# Patient Record
Sex: FEMALE | Race: WHITE | Employment: UNEMPLOYED | URBAN - METROPOLITAN AREA
[De-identification: names, ages, dates, MRNs, and addresses within clinical notes are randomized per-mention and may not be internally consistent; named-entity substitution may affect disease eponyms.]

---

## 2019-11-07 ENCOUNTER — HOSPITAL ENCOUNTER (INPATIENT)
Facility: CLINIC | Age: 49
LOS: 5 days | Discharge: HOME | DRG: 885 | End: 2019-11-12
Attending: PSYCHIATRY & NEUROLOGY | Admitting: PSYCHIATRY & NEUROLOGY
Payer: COMMERCIAL

## 2019-11-07 ENCOUNTER — HOSPITAL ENCOUNTER (EMERGENCY)
Facility: CLINIC | Age: 49
Discharge: PSYCHIATRIC OR SUBSTANCE USE FACILITY | End: 2019-11-07
Attending: EMERGENCY MEDICINE | Admitting: EMERGENCY MEDICINE
Payer: COMMERCIAL

## 2019-11-07 VITALS
TEMPERATURE: 98 F | RESPIRATION RATE: 18 BRPM | HEART RATE: 60 BPM | OXYGEN SATURATION: 98 % | BODY MASS INDEX: 29.45 KG/M2 | DIASTOLIC BLOOD PRESSURE: 59 MMHG | HEIGHT: 60 IN | WEIGHT: 150 LBS | SYSTOLIC BLOOD PRESSURE: 106 MMHG

## 2019-11-07 DIAGNOSIS — R45.851 SUICIDAL IDEATION: ICD-10-CM

## 2019-11-07 DIAGNOSIS — F33.2 SEVERE RECURRENT MAJOR DEPRESSION WITHOUT PSYCHOTIC FEATURES (H): Primary | ICD-10-CM

## 2019-11-07 PROBLEM — F32.A DEPRESSION: Status: ACTIVE | Noted: 2019-11-07

## 2019-11-07 LAB
AMPHETAMINES UR QL SCN: NEGATIVE
BARBITURATES UR QL: NEGATIVE
BENZODIAZ UR QL: NEGATIVE
CANNABINOIDS UR QL SCN: NEGATIVE
COCAINE UR QL: NEGATIVE
HCG UR QL: NEGATIVE
OPIATES UR QL SCN: NEGATIVE
PCP UR QL SCN: NEGATIVE

## 2019-11-07 PROCEDURE — 80307 DRUG TEST PRSMV CHEM ANLYZR: CPT | Performed by: EMERGENCY MEDICINE

## 2019-11-07 PROCEDURE — 25000132 ZZH RX MED GY IP 250 OP 250 PS 637: Mod: GY | Performed by: PSYCHIATRY & NEUROLOGY

## 2019-11-07 PROCEDURE — 12400000 ZZH R&B MH

## 2019-11-07 PROCEDURE — 99285 EMERGENCY DEPT VISIT HI MDM: CPT | Mod: 25

## 2019-11-07 PROCEDURE — 81025 URINE PREGNANCY TEST: CPT | Performed by: EMERGENCY MEDICINE

## 2019-11-07 PROCEDURE — 25000128 H RX IP 250 OP 636: Performed by: PSYCHIATRY & NEUROLOGY

## 2019-11-07 PROCEDURE — 90791 PSYCH DIAGNOSTIC EVALUATION: CPT

## 2019-11-07 RX ORDER — QUETIAPINE FUMARATE 50 MG/1
50 TABLET, FILM COATED ORAL AT BEDTIME
Status: DISCONTINUED | OUTPATIENT
Start: 2019-11-07 | End: 2019-11-08

## 2019-11-07 RX ORDER — HYDROXYZINE HYDROCHLORIDE 25 MG/1
25 TABLET, FILM COATED ORAL EVERY 4 HOURS PRN
Status: DISCONTINUED | OUTPATIENT
Start: 2019-11-07 | End: 2019-11-12 | Stop reason: HOSPADM

## 2019-11-07 RX ORDER — LEVOTHYROXINE SODIUM 75 UG/1
75 TABLET ORAL DAILY
Status: DISCONTINUED | OUTPATIENT
Start: 2019-11-07 | End: 2019-11-12 | Stop reason: HOSPADM

## 2019-11-07 RX ORDER — CYCLOBENZAPRINE HCL 10 MG
10 TABLET ORAL 3 TIMES DAILY PRN
Status: DISCONTINUED | OUTPATIENT
Start: 2019-11-07 | End: 2019-11-12 | Stop reason: HOSPADM

## 2019-11-07 RX ORDER — HYDROXYCHLOROQUINE SULFATE 200 MG/1
200 TABLET, FILM COATED ORAL DAILY
Status: DISCONTINUED | OUTPATIENT
Start: 2019-11-07 | End: 2019-11-08

## 2019-11-07 RX ORDER — QUETIAPINE FUMARATE 25 MG/1
25-50 TABLET, FILM COATED ORAL EVERY 6 HOURS PRN
Status: DISCONTINUED | OUTPATIENT
Start: 2019-11-07 | End: 2019-11-12 | Stop reason: HOSPADM

## 2019-11-07 RX ORDER — ONDANSETRON 4 MG/1
4 TABLET, ORALLY DISINTEGRATING ORAL EVERY 6 HOURS PRN
Status: DISCONTINUED | OUTPATIENT
Start: 2019-11-07 | End: 2019-11-12 | Stop reason: HOSPADM

## 2019-11-07 RX ORDER — LEVOTHYROXINE SODIUM 75 UG/1
75 TABLET ORAL DAILY
COMMUNITY

## 2019-11-07 RX ORDER — HYDROXYCHLOROQUINE SULFATE 200 MG/1
200 TABLET, FILM COATED ORAL DAILY
COMMUNITY

## 2019-11-07 RX ORDER — CYCLOBENZAPRINE HCL 5 MG
10 TABLET ORAL 3 TIMES DAILY PRN
COMMUNITY

## 2019-11-07 RX ADMIN — HYDROXYCHLOROQUINE SULFATE 200 MG: 200 TABLET ORAL at 18:23

## 2019-11-07 RX ADMIN — CYCLOBENZAPRINE HYDROCHLORIDE 10 MG: 10 TABLET, FILM COATED ORAL at 21:02

## 2019-11-07 RX ADMIN — ONDANSETRON 4 MG: 4 TABLET, ORALLY DISINTEGRATING ORAL at 16:52

## 2019-11-07 RX ADMIN — ACETAMINOPHEN, ASPIRIN AND CAFFEINE 1 TABLET: 250; 250; 65 TABLET, FILM COATED ORAL at 16:54

## 2019-11-07 RX ADMIN — LEVOTHYROXINE SODIUM 75 MCG: 75 TABLET ORAL at 16:52

## 2019-11-07 RX ADMIN — QUETIAPINE FUMARATE 50 MG: 50 TABLET ORAL at 21:03

## 2019-11-07 ASSESSMENT — ACTIVITIES OF DAILY LIVING (ADL)
SWALLOWING: 0-->SWALLOWS FOODS/LIQUIDS WITHOUT DIFFICULTY
DRESS: SCRUBS (BEHAVIORAL HEALTH)
AMBULATION: 0-->INDEPENDENT
HYGIENE/GROOMING: INDEPENDENT
FALL_HISTORY_WITHIN_LAST_SIX_MONTHS: NO
DRESS: 0-->INDEPENDENT
ORAL_HYGIENE: INDEPENDENT
LAUNDRY: UNABLE TO COMPLETE
COGNITION: 0 - NO COGNITION ISSUES REPORTED
TOILETING: 0-->INDEPENDENT
RETIRED_EATING: 0-->INDEPENDENT
BATHING: 0-->INDEPENDENT
RETIRED_COMMUNICATION: 0-->UNDERSTANDS/COMMUNICATES WITHOUT DIFFICULTY
TRANSFERRING: 0-->INDEPENDENT

## 2019-11-07 ASSESSMENT — MIFFLIN-ST. JEOR
SCORE: 1216.9
SCORE: 1058.14
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
## 2019-11-07 NOTE — PROGRESS NOTES
11/07/19 1417   Patient Belongings   Did you bring any home meds/supplements to the hospital?  Yes   Disposition of meds  Sent to security/pharmacy per site process   Patient Belongings locker;sent to security per site process   Patient Belongings Put in Hospital Secure Location (Security or Locker, etc.) other (see comments)   Belongings Search Yes   Clothing Search Yes   Second Staff Edwige GONZALEZ   Comment belongings searched      Cell phone   Jewelery   Sweatpants with strings   Sweat shirt with strings   Black boots  Bra   Grey t shirt   Socks   Purse   Wallet    Gift cards and business cards   [place] 's license   Paperwork   Perfume roller   Loose coins   USB cord (no adapter)   8gb hard drive   Headphones   Key chain with 3 keys and 2 car keys   Cyclobenzaprine HCL 10MG  $17 cash    Security Envelope #[id number]  VISA [id number]  AMEX [id number]  Care Credit [id number]  VISA 1220  VISA [id number]  VISA [id number]  $1,316.99 Check #[id number]      Admission:  I am responsible for any personal items that are not sent to the safe or pharmacy.  [facility] is not responsible for loss, theft or damage of any property in my possession.    Signature:  _________________________________ Date: _______  Time: _____                                              Staff Signature:  ____________________________ Date: ________  Time: _____      2nd Staff person, if patient is unable/unwilling to sign:    Signature: ________________________________ Date: ________  Time: _____     Discharge:  [facility] has returned all of my personal belongings:    Signature: _________________________________ Date: ________  Time: _____                                          Staff Signature:  ____________________________ Date: ________  Time: _____

## 2019-11-07 NOTE — ED PROVIDER NOTES
History     Chief Complaint:  Mental Health Problem    HPI  Rachael Martinez is a 49 year old female with a history of depression and SLE who presents to the emergency department today for evaluation of a mental pete problem. The patient reports a lot of recent changes in her life that has built up and is weighing heavily on her. Within the past year, she moved from [place] with her  in January, whom she  2.5 years ago. Her  got the job at [employer] as a mental pete therapist and she didn't. 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 She expresses feeling like a failure and is having difficulty keeping up with all the changes. She admits to not coping well with everything. The patient endorses thoughts of hurting herself and thoughts of stabbing herself in the arm with a letter opener. She was hospitalized once back in  after a suicide attempt where she tried to overdose on trazodone. She is afraid of harming herself and is open to hospitalization today. No thoughts of harming others.    Allergies:  No known drug allergies    Medications:    Flexeril    Past Medical History:    Depression  Lupus    Past Surgical History:    Cardiac ablation      Family History:    History reviewed. No pertinent family history.     Social History:  The patient reports that she has quit smoking. She smoked 0.00 packs per day. She has never used smokeless tobacco. She reports current alcohol use of about 3.0 standard drinks of alcohol per week. She reports that she does not use drugs.   PCP: No primary care provider on file.  Marital Status:     Review of Systems   Psychiatric/Behavioral: Positive for suicidal ideas.   All other systems reviewed and are negative.    Physical Exam     Patient Vitals for the past 24 hrs:   BP Temp Temp src Pulse Heart Rate Resp SpO2 Height Weight   19 1021 -- 98  F (36.7  C)  Oral -- -- -- -- 1.524 m (5') 68 kg (150 lb)   11/07/19 1016 110/70 -- Oral 65 66 20 100 % -- --     Physical Exam    General:   Pleasant, age appropriate female.  HEENT:    Oropharynx is moist.  Eyes:    Conjunctiva normal  Neck:    Supple, no meningismus.     CV:     Regular rate and rhythm.      No murmurs, rubs or gallops.       2+ radial pulses bilateral.   PULM:    Clear to auscultation bilateral.       No respiratory distress.      Good air exchange.  ABD:    Soft, non-tender, non-distended.       No rebound, guarding or rigidity.  MSK:     No gross deformity to all four extremities.   LYMPH:   No cervical lymphadenopathy.  NEURO:   Alert and oriented x 3.      Speech is clear with no aphasia.     Normal muscular tone, no tremor.  Skin:    Warm, dry and intact.    Psych:    Mood is depressed, affect is appropriate and congruent.     + suicidal ideation.     No delusions, hallucinations.     Memory intact.      Emergency Department Course     Laboratory:  Laboratory findings were communicated with the patient who voiced understanding of the findings.    Drug abuse screen 77 urine: Negative  HCG qual: Negative    Emergency Department Course:  Past medical records, nursing notes, and vitals reviewed.  1013: I performed an exam of the patient and obtained history, as documented above. GCS 15.    1056: I discussed the case with DEC regarding the patient.    1112: I discussed the case with DEC regarding the patient.    Findings and plan explained to the Patient.     Patient will be transferred to [facility] via EMS for admission to inpatient psychiatric bed.    Impression & Plan      Medical Decision Making:  Rachael Martinez is a 49 year old female who presents with suicidal ideation.  Patient is at high risk for suicide completion given her active suicidal thoughts with definitive plan, history of prior suicide attempt and inability to contract for safety.  Patient is agreeable to hospitalization.  Patient  was placed on SHITAL hold.  DEC is in agreement with acute psychiatric hospitalization.  Patient will be transferred to a psychiatric bed once bed available.    Diagnosis:    ICD-10-CM    1. Suicidal ideation R45.851 Drug abuse screen 77 urine     HCG qualitative urine       Disposition:   Transferred.      Scribe Disclosure:  I Drea Simón, am serving as a scribe at 10:13 AM on 11/7/2019 to document services personally performed by Simon Moss MD based on my observations and the provider's statements to me.    [facility] EMERGENCY DEPARTMENT       Simon Moss MD  11/07/19 9693

## 2019-11-07 NOTE — ED NOTES
Patient transferred via ambulance to [facility] 77. Report given to Sydnee at 1248. Patient transferred in stable condition. Pt calm and cooperative. ABCs intact. A&OX4. Report given to ambulance crew.

## 2019-11-07 NOTE — PROGRESS NOTES
Welcome packet reviewed with patient. Information reviewed includes getting emergency help, preventing infections, understanding your care, using medication safely, reducing falls, preventing pressure ulcers, smoking cessation, powerful choices and Patients Bill of Rights. Pt. given tour of the unit and instruction on use of facility including emergency call light. Program schedule reviewed with patient. Questions regarding the unit addressed. Pt. Search completed and belongings inventoried.    Nursing assessment complete including patient and medication profiles. Risk assessments completed addressing suicide,fall,skin,nutrition and safety issues. Care plan initiated. Assessments reviewed with physician and admit orders received. Video monitoring in progress, Patient Informed.
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
## 2019-11-08 LAB
ANION GAP SERPL CALCULATED.3IONS-SCNC: 5 MMOL/L (ref 3–14)
BUN SERPL-MCNC: 11 MG/DL (ref 7–30)
CALCIUM SERPL-MCNC: 8.6 MG/DL (ref 8.5–10.1)
CHLORIDE SERPL-SCNC: 110 MMOL/L (ref 94–109)
CO2 SERPL-SCNC: 26 MMOL/L (ref 20–32)
CREAT SERPL-MCNC: 0.9 MG/DL (ref 0.52–1.04)
ERYTHROCYTE [DISTWIDTH] IN BLOOD BY AUTOMATED COUNT: 13 % (ref 10–15)
GFR SERPL CREATININE-BSD FRML MDRD: 74 ML/MIN/{1.73_M2}
GLUCOSE SERPL-MCNC: 89 MG/DL (ref 70–99)
HCT VFR BLD AUTO: 39.5 % (ref 35–47)
HGB BLD-MCNC: 13 G/DL (ref 11.7–15.7)
MCH RBC QN AUTO: 28.3 PG (ref 26.5–33)
MCHC RBC AUTO-ENTMCNC: 32.9 G/DL (ref 31.5–36.5)
MCV RBC AUTO: 86 FL (ref 78–100)
PLATELET # BLD AUTO: 237 10E9/L (ref 150–450)
POTASSIUM SERPL-SCNC: 4.1 MMOL/L (ref 3.4–5.3)
RBC # BLD AUTO: 4.6 10E12/L (ref 3.8–5.2)
SODIUM SERPL-SCNC: 141 MMOL/L (ref 133–144)
T4 FREE SERPL-MCNC: 0.93 NG/DL (ref 0.76–1.46)
TSH SERPL DL<=0.005 MIU/L-ACNC: 10.4 MU/L (ref 0.4–4)
WBC # BLD AUTO: 3.6 10E9/L (ref 4–11)

## 2019-11-08 PROCEDURE — 84439 ASSAY OF FREE THYROXINE: CPT | Performed by: PSYCHIATRY & NEUROLOGY

## 2019-11-08 PROCEDURE — 25000132 ZZH RX MED GY IP 250 OP 250 PS 637: Mod: GY | Performed by: PSYCHIATRY & NEUROLOGY

## 2019-11-08 PROCEDURE — 25000132 ZZH RX MED GY IP 250 OP 250 PS 637: Mod: GY | Performed by: NURSE PRACTITIONER

## 2019-11-08 PROCEDURE — 80048 BASIC METABOLIC PNL TOTAL CA: CPT | Performed by: PSYCHIATRY & NEUROLOGY

## 2019-11-08 PROCEDURE — 12400000 ZZH R&B MH

## 2019-11-08 PROCEDURE — 84443 ASSAY THYROID STIM HORMONE: CPT | Performed by: PSYCHIATRY & NEUROLOGY

## 2019-11-08 PROCEDURE — 99223 1ST HOSP IP/OBS HIGH 75: CPT | Mod: AI | Performed by: NURSE PRACTITIONER

## 2019-11-08 PROCEDURE — 85027 COMPLETE CBC AUTOMATED: CPT | Performed by: PSYCHIATRY & NEUROLOGY

## 2019-11-08 PROCEDURE — 36415 COLL VENOUS BLD VENIPUNCTURE: CPT | Performed by: PSYCHIATRY & NEUROLOGY

## 2019-11-08 RX ORDER — QUETIAPINE FUMARATE 100 MG/1
100 TABLET, FILM COATED ORAL AT BEDTIME
Status: DISCONTINUED | OUTPATIENT
Start: 2019-11-08 | End: 2019-11-12 | Stop reason: HOSPADM

## 2019-11-08 RX ORDER — HYDROXYCHLOROQUINE SULFATE 200 MG/1
200 TABLET, FILM COATED ORAL DAILY
Status: DISCONTINUED | OUTPATIENT
Start: 2019-11-08 | End: 2019-11-12 | Stop reason: HOSPADM

## 2019-11-08 RX ORDER — ESCITALOPRAM OXALATE 10 MG/1
10 TABLET ORAL DAILY
Status: DISCONTINUED | OUTPATIENT
Start: 2019-11-08 | End: 2019-11-12 | Stop reason: HOSPADM

## 2019-11-08 RX ADMIN — QUETIAPINE FUMARATE 100 MG: 100 TABLET ORAL at 20:08

## 2019-11-08 RX ADMIN — LEVOTHYROXINE SODIUM 75 MCG: 75 TABLET ORAL at 10:15

## 2019-11-08 RX ADMIN — HYDROXYCHLOROQUINE SULFATE 200 MG: 200 TABLET ORAL at 10:16

## 2019-11-08 RX ADMIN — ESCITALOPRAM OXALATE 10 MG: 10 TABLET ORAL at 14:20

## 2019-11-08 ASSESSMENT — ACTIVITIES OF DAILY LIVING (ADL)
DRESS: INDEPENDENT
ORAL_HYGIENE: INDEPENDENT
HYGIENE/GROOMING: INDEPENDENT

## 2019-11-08 NOTE — PLAN OF CARE
"Pt mood is sad and depressed with full range affect. Thought process is organized. Insight is appropriate for her situation. Pt denies any suicidal ideation. She remains withdrawn and isolative to her room. C/o of nausea and a headache relieved with Zofran and Excedrin. Pt stated \"it's been a while\" since her last migraine. According to the patient, the her migraine typically causes her to be nauseated. She contracts for safety.  "

## 2019-11-08 NOTE — H&P
[facility]    History and Physical  Hospitalist       Date of Admission:  11/7/2019    Assessment & Plan   Rachael Martinez is a 49 year old female with a past medical history significant for hypothyroidism, SLE, depression, chronic back pain, alopecia and a history of cardiac ablation for afib in 2005 who presented to the Emergency Department for evaluation of depression and suicidal ideation.     Depression with suicidal ideation  Patient presents with worsening depression as well as thoughts of suicide with a plan. Reports multiple recent stressors, with active marital issues. Not on any psychiatric medications prior to admission.   --management per Psychiatry    Lupus Erythematosus. Patient follows with Rheumatology in [employer]. Two evals over the past year suggest low disease activity. PCP on 11/5 rechecked inflammatory markers and anti double stranded DNA (<12.3). Her eye exam was negative for cataracts, plans were to restart Plaquenil.   --Patient prefers to restart plaquenil, will start today    Chronic Back Pain  Thought to be muscular/mechanical nature, with some concern of fibromyalgia. Seen 11/5 with reevaluation of labs, inflammatory markers, CXR and EKG. Evaluated by [facility] most recently in 5/2019 with unremarkable bone scan for possible lupus activity. At that time, she had a left shoulder glenohumeral steroid injection which improved her symptoms for several months. Symptoms are currently under control with flexeril.   --Flexeril PRN, advised patient to use sparingly  --Tylenol PRN    Hypothyroidism. TSH last checked 11/5/2019 and elevated at 13.4 with T4 of 0.9. Patient started on levothyroxine at that time.   --continue PTA levothyroxine 75mcg daily  --will need TSH recheck in 6 weeks    DVT Prophylaxis: Low Risk/Ambulatory with no VTE prophylaxis indicated  Code Status: Full Code    Disposition: Expected discharge in 2-3 days at the discretion of psychiatry.    Hospital  service will sign off at this time, please do not hesitate to consult us for acute medical issues that arise during her stay.      LUIS Abernathy [facility]  Hospitalist Service  House Officer  Pager: [phone] (1q - 6p)      Primary Care Physician   [facility]    Chief Complaint   Depression with suicidal ideation    History is obtained from the patient    History of Present Illness   Rachael Martinez is a 49 year old female with a past medical history significant for hypothyroidism, SLE, depression, chronic back pain, alopecia who presented to the Emergency Department for evaluation of depression and suicidal ideation.     Past Medical History    I have reviewed this patient's medical history and updated it with pertinent information if needed.   Past Medical History:   Diagnosis Date     Chronic back pain      Depression      Hypothyroidism      Lupus (H)        Past Surgical History   I have reviewed this patient's surgical history and updated it with pertinent information if needed.  Past Surgical History:   Procedure Laterality Date     CARDIAC SURGERY      Cardiac ablation in      GYN SURGERY             Prior to Admission Medications   Prior to Admission Medications   Prescriptions Last Dose Informant Patient Reported? Taking?   cyclobenzaprine (FLEXERIL) 5 MG tablet Unknown at Unknown time  Yes No   Sig: Take 10 mg by mouth 3 times daily as needed for muscle spasms   hydroxychloroquine (PLAQUENIL) 200 MG tablet Unknown at Unknown time  Yes No   Sig: Take by mouth daily   levothyroxine (SYNTHROID/LEVOTHROID) 75 MCG tablet 2019 at Unknown time Self Yes Yes   Sig: Take 75 mcg by mouth      Facility-Administered Medications: None     Allergies   No Known Allergies    Social History   I have reviewed this patient's social history and updated it with pertinent information if needed. Rachael Martinez  reports that she has quit smoking. She smoked 0.00 packs per day. She has never  used smokeless tobacco. She reports current alcohol use of about 3.0 standard drinks of alcohol per week. She reports that she does not use drugs.    Family History   I have reviewed this patient's family history and updated it with pertinent information if needed.   Family History   Problem Relation Age of Onset     Breast Cancer Mother      Colon Cancer Father        Review of Systems   The 10 point Review of Systems is negative other than noted in the HPI or here.     Physical Exam   Temp: 98  F (36.7  C) Temp src: Oral BP: 104/64 Pulse: 67   Resp: 16 SpO2: 99 % O2 Device: None (Room air)    Vital Signs with Ranges  Temp:  [98  F (36.7  C)] 98  F (36.7  C)  Pulse:  [60-67] 67  Heart Rate:  [66] 66  Resp:  [16-20] 16  BP: (104-110)/(59-70) 104/64  SpO2:  [92 %-100 %] 99 %  115 lbs 0 oz    Physical Exam  Constitutional:       General: She is not in acute distress.     Appearance: Normal appearance. She is normal weight.   HENT:      Head: Normocephalic.      Mouth/Throat:      Mouth: Mucous membranes are dry.   Eyes:      Extraocular Movements: Extraocular movements intact.      Pupils: Pupils are equal, round, and reactive to light.   Neck:      Musculoskeletal: Neck supple.   Cardiovascular:      Rate and Rhythm: Normal rate and regular rhythm.      Heart sounds: Normal heart sounds. No murmur.   Pulmonary:      Effort: Pulmonary effort is normal. No respiratory distress.      Breath sounds: Normal breath sounds.   Abdominal:      General: Abdomen is flat.      Palpations: Abdomen is soft.      Tenderness: There is no tenderness.   Musculoskeletal: Normal range of motion.   Skin:     General: Skin is warm and dry.   Neurological:      General: No focal deficit present.      Mental Status: She is alert.   Psychiatric:         Attention and Perception: Attention normal. She does not perceive auditory or visual hallucinations.         Mood and Affect: Mood normal.         Speech: Speech normal.         Behavior:  Behavior normal.         Thought Content: Thought content normal.         Data   Data reviewed today:  I personally reviewed no images or EKG's today.  No lab results found in last 7 days.    No results found for this or any previous visit (from the past 24 hour(s)).

## 2019-11-08 NOTE — PLAN OF CARE
Shift Update: Pt mood is blunt and flat. Thought process is intact. Insight is intact. Pt denies suicidal ideation. Pt  is depressed and felt if she were at home she would be suicidal. Pt states she is a light sleeper and did not sleep last night as her roommate snores. She requested a private or else she would want to be discharged, Pt was moved to 729. Pt started on Lexapro.
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
## 2019-11-09 PROCEDURE — 25000132 ZZH RX MED GY IP 250 OP 250 PS 637: Mod: GY | Performed by: NURSE PRACTITIONER

## 2019-11-09 PROCEDURE — 99207 ZZC NON-BILLABLE SERV PER CHARTING: CPT | Performed by: NURSE PRACTITIONER

## 2019-11-09 PROCEDURE — 25000132 ZZH RX MED GY IP 250 OP 250 PS 637: Mod: GY | Performed by: PSYCHIATRY & NEUROLOGY

## 2019-11-09 PROCEDURE — 12400000 ZZH R&B MH

## 2019-11-09 RX ORDER — IBUPROFEN 200 MG
200 TABLET ORAL EVERY 6 HOURS PRN
Status: DISCONTINUED | OUTPATIENT
Start: 2019-11-09 | End: 2019-11-12 | Stop reason: HOSPADM

## 2019-11-09 RX ADMIN — LEVOTHYROXINE SODIUM 75 MCG: 75 TABLET ORAL at 09:21

## 2019-11-09 RX ADMIN — QUETIAPINE FUMARATE 100 MG: 100 TABLET ORAL at 21:36

## 2019-11-09 RX ADMIN — IBUPROFEN 200 MG: 200 TABLET, FILM COATED ORAL at 15:01

## 2019-11-09 RX ADMIN — HYDROXYCHLOROQUINE SULFATE 200 MG: 200 TABLET ORAL at 09:21

## 2019-11-09 RX ADMIN — ESCITALOPRAM OXALATE 10 MG: 10 TABLET ORAL at 09:21

## 2019-11-09 ASSESSMENT — ACTIVITIES OF DAILY LIVING (ADL)
LAUNDRY: WITH SUPERVISION
ORAL_HYGIENE: INDEPENDENT
DRESS: INDEPENDENT
ORAL_HYGIENE: INDEPENDENT
LAUNDRY: UNABLE TO COMPLETE
HYGIENE/GROOMING: INDEPENDENT
DRESS: INDEPENDENT
HYGIENE/GROOMING: INDEPENDENT

## 2019-11-09 NOTE — PLAN OF CARE
Problem: Depressive Symptoms  Goal: Depressive Symptoms  Description  Signs and symptoms of listed problems will be absent or manageable.  Depression, anxiety   Flowsheets (Taken 11/9/2019 4186)  Depressive Symptoms Assessed: suicidality; affect; mood; speech; sleep; psychomotor activity; thought process; insight; anxiety  Depressive Symptoms Present: suicidality; affect; anxiety; insight; thought process; psychomotor activity; sleep; speech; mood  Note:   Pt has been isolative but social upon approach. Pt has been ruminating about her job loss and concerned about her firing. Pt was anxious about the whole situational and upset that those things didn't change. Pt did come to the nurses statin about 1300 c/o of some Right sided facial swelling. Pt states she has had this happen before but is not sure if it is from her Lupus. Swelling ws not painful to the touch but when she was chewing or eating. Pt has not SOB and O2 ws 96 percent. Pt is pleasant and cooperative.Hospitalist saw pt for swelling. Assessed and hospitalist felt her swelling may be due to  Parotid stones. Pt encouraged to eat sour foods or sour candy to help alleviate pain. Pt also order ibuprofen in addition to manage many pain. Nursing to continue to monitor.

## 2019-11-09 NOTE — PLAN OF CARE
BEHAVIORAL TEAM DISCUSSION    Participants: MD, RN, CM, PA, OT   Progress: No change. Newly admitted to 77  Anticipated length of stay:  3-5 days  Continued Stay Criteria/Rationale: Psychiatric stabilization  Medical/Physical: Per hospitalist consult  Precautions: None  Behavioral Orders   Procedures    Code 1 - Restrict to Unit    Routine Programming     As clinically indicated    Status 15     Every 15 minutes.     Plan:  started on Lexapro at 10 mg daily and Seroquel at 100 mg at bedtime given her reported insomnia and ruminative thoughts.   Rationale for change in precautions or plan: Continued psychiatric stabilization.

## 2019-11-09 NOTE — PROGRESS NOTES
Brief house NP note  11/9/2019  1300    I was called to reassess Ms. Kaushal Martinez for unilateral right jaw swelling. Patient has had this before, and actually states this particular incident is less severe than previous episodes. She denies any acute pain, she is able to eat, swallow, manage her secretions. Exam shows small firm mandibular swelling directly below the pre auricular nodes, question parotid stone? The patient was concerned about her underlying lupus and if this was a symptom - we discussed the difficulty in discerning the etiology of the swelling but is willing to try treatment for parotid stones.  She was restarted on her Plaquenil yesterday.     Will continue to monitor, the patient is reliable to report concerning symptoms we discussed.        LUIS Abernathy [facility]  Hospitalist Service  House Officer  Pager: [phone] (9o - 6p)

## 2019-11-09 NOTE — PLAN OF CARE
Patient visible this shift, pleasant and cooperative. Declined groups, reading in room & in bed at 8pm. Patient hopes to catch up on sleep tonight now that she has a room to herself.   Patient mood is stable. Thought process is logical. Insight is improving. Pt denies suicidal ideation. Recent Med changes Lexapro added & 100mg Seroquel at HS. BGM none, Abnormal labs/ Vital signs none Privileges SDU, CIWA none. ECT# none. Plan med adjustment

## 2019-11-10 PROCEDURE — 25000132 ZZH RX MED GY IP 250 OP 250 PS 637: Mod: GY | Performed by: PSYCHIATRY & NEUROLOGY

## 2019-11-10 PROCEDURE — 25000132 ZZH RX MED GY IP 250 OP 250 PS 637: Mod: GY | Performed by: NURSE PRACTITIONER

## 2019-11-10 PROCEDURE — 12400000 ZZH R&B MH

## 2019-11-10 RX ADMIN — CYCLOBENZAPRINE HYDROCHLORIDE 10 MG: 10 TABLET, FILM COATED ORAL at 20:51

## 2019-11-10 RX ADMIN — ACETAMINOPHEN, ASPIRIN AND CAFFEINE 1 TABLET: 250; 250; 65 TABLET, FILM COATED ORAL at 14:41

## 2019-11-10 RX ADMIN — LEVOTHYROXINE SODIUM 75 MCG: 75 TABLET ORAL at 08:23

## 2019-11-10 RX ADMIN — QUETIAPINE FUMARATE 100 MG: 100 TABLET ORAL at 20:50

## 2019-11-10 RX ADMIN — ESCITALOPRAM OXALATE 10 MG: 10 TABLET ORAL at 08:23

## 2019-11-10 RX ADMIN — HYDROXYCHLOROQUINE SULFATE 200 MG: 200 TABLET ORAL at 08:23

## 2019-11-10 ASSESSMENT — ACTIVITIES OF DAILY LIVING (ADL)
LAUNDRY: UNABLE TO COMPLETE
HYGIENE/GROOMING: INDEPENDENT
ORAL_HYGIENE: INDEPENDENT
DRESS: INDEPENDENT

## 2019-11-10 NOTE — PLAN OF CARE
Pt is visible in the unit but minimally sociable with peers. Spent mores time bed resting in her room, Pt did however took shower this Morning and feels a little bit better. 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 Writer however encourage to still attend and she her expertise with other peers.  who is a therapist is also here visiting and they are doing pretty well. Pt is med compliant and denies any SI but reported some light headache and believes it might be sight effects of the medications. Excedrin administered and a can of prune Juice for constipation

## 2019-11-10 NOTE — PLAN OF CARE
Pt is A&O, presents as sad , mood is depresses, with flat affect. Speech is clear. Pt s thought process is organized. Behavior is appropriate. She was mostly isolative to her room and withdrawn. Pt is bright upon approach. She has insight into her situation. Displays. Pt endorsed suicidal ideation, but contracts for safety. Pt is hoping to be discharged by next Thursday.

## 2019-11-11 PROCEDURE — 12400000 ZZH R&B MH

## 2019-11-11 PROCEDURE — 25000132 ZZH RX MED GY IP 250 OP 250 PS 637: Mod: GY | Performed by: PSYCHIATRY & NEUROLOGY

## 2019-11-11 PROCEDURE — 25000132 ZZH RX MED GY IP 250 OP 250 PS 637: Mod: GY | Performed by: NURSE PRACTITIONER

## 2019-11-11 RX ADMIN — LEVOTHYROXINE SODIUM 75 MCG: 75 TABLET ORAL at 08:16

## 2019-11-11 RX ADMIN — QUETIAPINE FUMARATE 100 MG: 100 TABLET ORAL at 21:17

## 2019-11-11 RX ADMIN — IBUPROFEN 200 MG: 200 TABLET, FILM COATED ORAL at 08:16

## 2019-11-11 RX ADMIN — HYDROXYCHLOROQUINE SULFATE 200 MG: 200 TABLET ORAL at 08:16

## 2019-11-11 RX ADMIN — CYCLOBENZAPRINE HYDROCHLORIDE 10 MG: 10 TABLET, FILM COATED ORAL at 21:17

## 2019-11-11 RX ADMIN — ESCITALOPRAM OXALATE 10 MG: 10 TABLET ORAL at 08:16

## 2019-11-11 ASSESSMENT — ACTIVITIES OF DAILY LIVING (ADL)
ORAL_HYGIENE: INDEPENDENT
LAUNDRY: WITH SUPERVISION
HYGIENE/GROOMING: INDEPENDENT
DRESS: INDEPENDENT

## 2019-11-11 NOTE — PROGRESS NOTES
[facility] Psychiatric Progress Note      Interval History:   Pt seen, team meeting held with , nursing staff, OT, and PA's to review diagnosis and treatment plan.  Staff reported the patient has been bed resting for most of her stay in the hospital.  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  Her  visited over the weekend.  She denies experiencing any self-harm thoughts plans or intent and wonders how she got to this point.  She states she has been reflecting on events that led to her hospitalization and feels she would benefit from following up with a therapist and psychiatrist.  She states she would just like the treatment team to refer her to non-[facility] providers to avoid conflicts.  She is hoping to discharge tomorrow.  She denies experiencing any untoward effects from her currently prescribed medication.   Review of systems:   The Review of Systems completed by Tyshawn Naik MD is negative other than noted in the HPI     Medications:       escitalopram  10 mg Oral Daily     hydroxychloroquine  200 mg Oral Daily     levothyroxine  75 mcg Oral Daily     QUEtiapine  100 mg Oral At Bedtime     aspirin-acetaminophen-caffeine, cyclobenzaprine, hydrOXYzine, ibuprofen, ondansetron, QUEtiapine    Mental Status Examination:     Appearance:  awake, alert, adequately groomed, appeared as age stated and casually dressed  Attitude:  cooperative  Eye Contact:  better  Mood:  better  Affect:  appropriate and in normal range and mood congruent  Speech:  clear, coherent and normal prosody  Psychomotor Behavior:  no evidence of tardive dyskinesia, dystonia, or tics and intact station, gait and muscle tone  Thought Process:  logical, linear and goal oriented  Associations:  no loose associations  Thought Content:  no evidence of suicidal  ideation or homicidal ideation and no evidence of psychotic thought  Insight:  fair  Judgment:  fair  Oriented to:  time, person, and place  Attention Span and Concentration:  intact  Recent and Remote Memory:  fair  Language: Able to name objects, Able to repeat phrases and Able to read and write  Fund of Knowledge: appropriate  Muscle Strength and Tone: normal  Gait and Station: Normal          Labs/Vitals:   BP 97/59   Pulse 60   Temp 97.9  F (36.6  C) (Oral)   Resp 16   Ht 1.524 m (5')   Wt 52.2 kg (115 lb)   SpO2 99%   BMI 22.46 kg/m    No results found for this or any previous visit (from the past 24 hour(s)).    Impression:   Rachael De Jesus is a 49-year-old  mother of an adult son from a previous relationship, who presents to [facility] via [facility] ED on account of expressing suicidal ideations and worsening depression in the context of being fired from being a new job.  She had relocated to [place] within the year and was having a hard time settling into [place].  She claims everything just occurred on a slower pace and she does not understand the school system here in [place].       DIagnoses:     1.  Adjustment disorder with mixed anxiety and depressed mood.   2.  Major depressive disorder, recurrent, moderate.   3.  Systemic lupus erythematosus.   4.  History of traumatic brain injury.   5.  Hypothyroidism.   6.  History of [place]'s disease.  7.  Rule out unspecified personality disorder with borderline traits.            Plan:   1. Written information given on medications. Side effects, risks, benefits reviewed.  2. Medication changes: Maintain current medications without changes.  3. Legal Status: Voluntary.  4.  Continue hospitalization.  Possible discharge tomorrow.      Attestation:  Patient has been seen and evaluated by me, Tyshawn Naik MD today.    PATIENT ID  Name: Rachael Easley  Juan  MRN:[id number]  YOB: 1970

## 2019-11-11 NOTE — PLAN OF CARE
BEHAVIORAL TEAM DISCUSSION    Participants: MD, RN, CM, PA, OT   Progress: Slight improvement  Anticipated length of stay: Likely discharge tomorrow.   Continued Stay Criteria/Rationale: Continued psychiatric stabilization.  Medical/Physical: Per hospitalist consult.   Precautions: None  Behavioral Orders   Procedures    Code 1 - Restrict to Unit    Routine Programming     As clinically indicated    Status 15     Every 15 minutes.     Plan: Maintain current medications.   Rationale for change in precautions or plan: Continued psychiatric stabilization.

## 2019-11-11 NOTE — PLAN OF CARE
Problem: Depressive Symptoms  Goal: Depressive Symptoms  Description  Signs and symptoms of listed problems will be absent or manageable.  Depression, anxiety   Outcome: No Change  Flowsheets (Taken 11/11/2019 1242)  Depressive Symptoms Assessed: all  Depressive Symptoms Present: affect; mood; insight; other (see comment)  Note:   Pt presents with a flat affect and calm mood. Her thought processes are organized, insight is appropriate, and judgement is impaired. Pt was isolative, and spent most of the shift in her room. She plans to be discharged tomorrow, and hopes to meet with a  to receive information about outpatient resources. Pt is worried about finding work post-discharge, and continues to experience negative thoughts that contribute to her depression. She denies Si and is med-compliant.

## 2019-11-11 NOTE — PLAN OF CARE
"  Problem: Depressive Symptoms  Goal: Depressive Symptoms  Description  Signs and symptoms of listed problems will be absent or manageable.  Depression, anxiety   11/10/2019 2129 by Edwige Mcknight  Outcome: No Change  Flowsheets  Taken 11/9/2019 1352 by Chau Melvin RN  Depressive Symptoms Assessed: suicidality;affect;mood;speech;sleep;psychomotor activity;thought process;insight;anxiety  Taken 11/10/2019 2129 by Edwige Mcknight  Depressive Symptoms Present: affect;mood;anxiety;insight  Note:   Pt presents with a flat affect and depressed mood. Pt thought process and insight impaired. Pt spent most of the shift withdrawn to her room, reading or resting. 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 Pt is anxious and hoping to discharge soon. When asked if she feels she is ready to discharge, pt looked at staff and just said \"ambivalent\". Pt denies Si at this time.     "

## 2019-11-12 VITALS
HEIGHT: 60 IN | OXYGEN SATURATION: 97 % | SYSTOLIC BLOOD PRESSURE: 89 MMHG | BODY MASS INDEX: 22.58 KG/M2 | HEART RATE: 69 BPM | RESPIRATION RATE: 16 BRPM | WEIGHT: 115 LBS | TEMPERATURE: 97.8 F | DIASTOLIC BLOOD PRESSURE: 45 MMHG

## 2019-11-12 PROCEDURE — 25000132 ZZH RX MED GY IP 250 OP 250 PS 637: Mod: GY | Performed by: NURSE PRACTITIONER

## 2019-11-12 PROCEDURE — 25000132 ZZH RX MED GY IP 250 OP 250 PS 637: Mod: GY | Performed by: PSYCHIATRY & NEUROLOGY

## 2019-11-12 RX ORDER — HYDROXYZINE HYDROCHLORIDE 25 MG/1
25 TABLET, FILM COATED ORAL EVERY 8 HOURS PRN
Qty: 90 TABLET | Refills: 0 | Status: SHIPPED | OUTPATIENT
Start: 2019-11-12 | End: 2019-12-12

## 2019-11-12 RX ORDER — QUETIAPINE FUMARATE 100 MG/1
100 TABLET, FILM COATED ORAL AT BEDTIME
Qty: 30 TABLET | Refills: 0 | Status: SHIPPED | OUTPATIENT
Start: 2019-11-12 | End: 2019-12-12

## 2019-11-12 RX ORDER — ESCITALOPRAM OXALATE 10 MG/1
10 TABLET ORAL DAILY
Qty: 30 TABLET | Refills: 0 | Status: SHIPPED | OUTPATIENT
Start: 2019-11-13 | End: 2019-12-13

## 2019-11-12 RX ADMIN — LEVOTHYROXINE SODIUM 75 MCG: 75 TABLET ORAL at 08:33

## 2019-11-12 RX ADMIN — ESCITALOPRAM OXALATE 10 MG: 10 TABLET ORAL at 08:33

## 2019-11-12 RX ADMIN — HYDROXYCHLOROQUINE SULFATE 200 MG: 200 TABLET ORAL at 08:32

## 2019-11-12 ASSESSMENT — ACTIVITIES OF DAILY LIVING (ADL)
ORAL_HYGIENE: INDEPENDENT
DRESS: INDEPENDENT
HYGIENE/GROOMING: INDEPENDENT
LAUNDRY: WITH SUPERVISION

## 2019-11-12 ASSESSMENT — MIFFLIN-ST. JEOR: SCORE: 1058.14

## 2019-11-12 NOTE — PROGRESS NOTES
met with patient this morning to go over the aftercare options given to her by  yesterday afternoon. She decided that she would like to follow up with [facility] for both psychiatry and therapy.  had her sign a release of information form for [facility] and then called to make the referral. Patient stated to  that her car is at [facility] in the parking lot as she was brought by ambulance from [facility] to [facility].  will have patient's nurse call for a cab when she is ready to discharge to transport her back to her car.

## 2019-11-12 NOTE — DISCHARGE INSTRUCTIONS
Behavioral Discharge Planning and Instructions    Summary:  Admitted for worsening depression.     Main Diagnosis:   Adjustment Disorder with mixed anxiety and depressed mood; Major Depressive Disorder, recurrent, moderate; Systemic Lupus Erythematosus; History of Traumatic Brain Injury; Hypothyroidism; History of [place]'s disease; Rule out Unspecified Personality Disorder with borderline traits.    Major Treatments, Procedures and Findings:  Psychiatric assessment. Medication adjustment.     Symptoms to Report: Losing more sleep or sleeping too much, Mood getting worse or Thoughts of harm to self or others.     Lifestyle Adjustment:  Follow all treatment recommendations. Develop and follow safety plan. Your safety plan is your contract with yourself to keep you safe in a crisis. Be sure to use the resources and crisis numbers listed on your safety plan.     Psychiatry Follow-up:     A referral has been made for psychiatry and therapy through [facility] in [place]. Their intake department will give you a call within two to three days to schedule appointments and get your insurance information. You can always give them a call as well. It is recommended that you see a psychiatrist within 30 days of discharge in order to get your medications refilled. Per [facility], as long as you have an appointment scheduled you can get a refill on medications if needed.     [facility]  [address] [place] [address]  [phone] / [phone] fax    Your car is parked at [facility] in [place]. The address is: [address] [place] [address]    Resources:   Crisis Intervention: 187.766.5319 or 385-099-6341 (TTY: 377.899.7601).  Call anytime for help.  National [place] on Mental Illness (www.[place].phi.org): 932.898.7140 or 853-843-3125.  National Suicide Prevention Line (www.mentalhealthmn.org): 943-413-OGYU (9655)  CRISIS RESPONSE FOR [facility]  (3-960-PUFICZ8)    General Medication Instructions:   See your medication sheet(s) for instructions.   Take all medicines as directed.  Make no changes unless your doctor suggests them.   Go to all your doctor visits.  Be sure to have all your required lab tests. This way, your medicines can be refilled on time.  Do not use any drugs not prescribed by your doctor.  Avoid alcohol.

## 2019-11-12 NOTE — PROGRESS NOTES
Patient denies suicidal ideation.  Reviewed discharge instructions with patient and patient has a copy of the AVS and verbalizes understanding of them. Discharged via cab to her car at [facility] in [place] at 1220.

## 2019-11-24 NOTE — DISCHARGE SUMMARY
[facility]    Discharge Summary  Adult Psychiatry    Date of Admission:  11/7/2019  Date of Discharge:  11/12/2019 12:54 PM  Discharging Provider: Tyshawn Naik MD      Discharge Diagnoses   1.  Adjustment disorder with mixed anxiety and depressed mood.   2.  Major depressive disorder, recurrent, moderate.   3.  Systemic lupus erythematosus.   4.  History of traumatic brain injury.   5.  Hypothyroidism.   6.  History of [place]'s disease.  7.  Rule out unspecified personality disorder with borderline traits.    History of Present Illness   Rachael De Jesus is a 49-year-old woman who is  and has a 31-year-old son from a previous relationship.  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  She presented to the [facility] Emergency Department on account of worsening depression in addition to self-injurious thoughts in the context of losing her job.  She is admitted as a voluntary patient. For more details, I refer the reader to the initial psychiatric assessment documented on 11/8/2019 by Tyshawn Naik MD in addition to the history and physical examination documented by Lazara SMITH CNP on 11/8/2019.    Hospital Course   Rachael De Jesus was admitted on 11/7/2019.  Following admission to the mental health unit the patient was introduced to the milieu and encouraged to participate in individual milieu and group therapy.  She was given the opportunity to ventilate her stressors and staff provided active listening and validated her concerns.  She discussed how she had become quite despondent on account of her inability to secure employment as she had expected as a result of which she began to feel quite hopeless and helpless.  The benefits of starting an antidepressant was discussed with her and she was offered Lexapro at 10 mg daily in conjunction with Seroquel at 100 mg daily  at bedtime to mitigate her anxiety, depression and insomnia as well as ruminating worry.  The patient did not engage in group therapy to any significant degree but did comply with recommended treatment.  She receives support from her  who visited on the unit.  She did tolerate the prescribed medications without any untoward effects.  Her negative cognitions were pointed out to her and she was encouraged to utilize appropriate coping skills to deal with her frustrations and feelings.  She soon began to report absence of self-harm thoughts plans or intent and verbalized future orientation with a plan to pursue outpatient psychiatric care at [facility].  Throughout her stay in the hospital she denied experiencing any self-harm thoughts plans or intent.  By 11/12/2019, the patient reported absence of self-harm thoughts plans or intent and requested to be discharged to the community.  She indicated that her vehicle was at [facility] and so transportation was facilitated by the unit  to that hospital.  At the point of discharge she was devoid of self-harm thoughts plans or intent.    Tyshawn Naik MD    Significant Results and Procedures   Please see below.    Unresulted Labs Ordered in the Past 30 Days of this Admission     Date and Time Order Name Status Description    11/8/2019 0800 T4 free In process           Code Status   Full Code    Primary Care Physician   [facility]    Physical Exam   Appearance:  awake, alert, adequately groomed and casually dressed  Attitude:  cooperative  Eye Contact:  good  Mood:  good  Affect:  appropriate and in normal range and mood congruent  Speech:  clear, coherent and normal prosody  Psychomotor Behavior:  no evidence of tardive dyskinesia, dystonia, or tics and intact station, gait and muscle tone  Thought Process:  logical, linear and goal oriented  Associations:  no loose associations  Thought Content:  no evidence of  suicidal ideation or homicidal ideation and no evidence of psychotic thought  Insight:  good  Judgment:  intact  Oriented to:  time, person, and place  Attention Span and Concentration:  intact  Recent and Remote Memory:  intact  Language: Able to name objects and Able to repeat phrases  Fund of Knowledge: appropriate  Muscle Strength and Tone: normal  Gait and Station: Normal    Time Spent on this Encounter   Tyshawn HUMMEL MD, personally saw the patient today and spent greater than 30 minutes discharging this patient.    Discharge Disposition   Discharged to home  Condition at discharge: Stable    PSYCHIATRY FOLLOW-UP:  A referral has been made for psychiatry and therapy through [facility] in [place]. Their intake department will give you a call within two to three days to schedule appointments and get your insurance information. You can always give them a call as well. It is recommended that you see a psychiatrist within 30 days of discharge in order to get your medications refilled. Per [facility], as long as you have an appointment scheduled you can get a refill on medications if needed.     [address] [address] [phone] / [phone] fax    Your car is parked at [facility] in [place]. [place] [address]      Consultations This Hospital Stay   HOSPITALIST IP CONSULT  HOSPITALIST IP CONSULT    Discharge Orders   No discharge procedures on file.  Discharge Medications   Discharge Medication List as of 11/12/2019 11:57 AM      START taking these medications    Details   escitalopram (LEXAPRO) 10 MG tablet Take 1 tablet (10 mg) by mouth daily, Disp-30 tablet, R-0, E-Prescribe      hydrOXYzine (ATARAX) 25 MG tablet Take 1 tablet (25 mg) by mouth every 8 hours as needed for anxiety, Disp-90 tablet, R-0, E-Prescribe      QUEtiapine (SEROQUEL) 100 MG tablet Take 1 tablet (100  mg) by mouth At Bedtime, Disp-30 tablet, R-0, E-Prescribe         CONTINUE these medications which have NOT CHANGED    Details   cyclobenzaprine (FLEXERIL) 5 MG tablet Take 10 mg by mouth 3 times daily as needed for muscle spasms, Historical      hydroxychloroquine (PLAQUENIL) 200 MG tablet Take 200 mg by mouth daily , Historical      levothyroxine (SYNTHROID/LEVOTHROID) 75 MCG tablet Take 75 mcg by mouth daily , Historical           Allergies   No Known Allergies  Data   Most Recent 3 CBC's:  Recent Labs   Lab Test 11/08/19  0800   WBC 3.6*   HGB 13.0   MCV 86         Most Recent 3 BMP's:  Recent Labs   Lab Test 11/08/19  0800      POTASSIUM 4.1   CHLORIDE 110*   CO2 26   BUN 11   CR 0.90   ANIONGAP 5   MARIBELL 8.6   GLC 89     Most Recent 2 LFT's:No lab results found.   Panel:No lab results found.  Most Recent 6 Bacteria Isolates From Any Culture (See EPIC Reports for Culture Details):No lab results found.  Most Recent TSH, T4 and A1c Labs:  Recent Labs   Lab Test 11/08/19  0800   TSH 10.40*   T4 0.93     No results found for this or any previous visit.
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
